# Patient Record
Sex: FEMALE | Race: BLACK OR AFRICAN AMERICAN | NOT HISPANIC OR LATINO | URBAN - METROPOLITAN AREA
[De-identification: names, ages, dates, MRNs, and addresses within clinical notes are randomized per-mention and may not be internally consistent; named-entity substitution may affect disease eponyms.]

---

## 2018-06-27 ENCOUNTER — EMERGENCY (EMERGENCY)
Facility: HOSPITAL | Age: 38
LOS: 1 days | Discharge: HOME | End: 2018-06-27
Attending: EMERGENCY MEDICINE
Payer: COMMERCIAL

## 2018-06-27 VITALS — WEIGHT: 154.1 LBS | HEIGHT: 61 IN

## 2018-06-27 PROCEDURE — 99284 EMERGENCY DEPT VISIT MOD MDM: CPT

## 2018-06-28 VITALS
SYSTOLIC BLOOD PRESSURE: 122 MMHG | TEMPERATURE: 98 F | RESPIRATION RATE: 16 BRPM | OXYGEN SATURATION: 100 % | DIASTOLIC BLOOD PRESSURE: 80 MMHG | HEART RATE: 80 BPM

## 2018-06-28 LAB
APPEARANCE UR: CLEAR — SIGNIFICANT CHANGE UP
BILIRUB UR-MCNC: NEGATIVE — SIGNIFICANT CHANGE UP
COLOR SPEC: YELLOW — SIGNIFICANT CHANGE UP
DIFF PNL FLD: ABNORMAL
GLUCOSE UR QL: NEGATIVE MG/DL — SIGNIFICANT CHANGE UP
HCG UR QL: NEGATIVE — SIGNIFICANT CHANGE UP
KETONES UR-MCNC: NEGATIVE — SIGNIFICANT CHANGE UP
LEUKOCYTE ESTERASE UR-ACNC: ABNORMAL
NITRITE UR-MCNC: NEGATIVE — SIGNIFICANT CHANGE UP
PH UR: 6 — SIGNIFICANT CHANGE UP (ref 5–8)
PROT UR-MCNC: NEGATIVE MG/DL — SIGNIFICANT CHANGE UP
RBC CASTS # UR COMP ASSIST: SIGNIFICANT CHANGE UP /HPF (ref 0–4)
SP GR SPEC: 1.01 — SIGNIFICANT CHANGE UP (ref 1.01–1.02)
UROBILINOGEN FLD QL: NEGATIVE MG/DL — SIGNIFICANT CHANGE UP
WBC UR QL: SIGNIFICANT CHANGE UP

## 2018-06-28 PROCEDURE — 87086 URINE CULTURE/COLONY COUNT: CPT

## 2018-06-28 PROCEDURE — 76830 TRANSVAGINAL US NON-OB: CPT | Mod: 26

## 2018-06-28 PROCEDURE — 81025 URINE PREGNANCY TEST: CPT

## 2018-06-28 PROCEDURE — 76856 US EXAM PELVIC COMPLETE: CPT | Mod: 26

## 2018-06-28 PROCEDURE — 76856 US EXAM PELVIC COMPLETE: CPT

## 2018-06-28 PROCEDURE — 99284 EMERGENCY DEPT VISIT MOD MDM: CPT

## 2018-06-28 PROCEDURE — 76830 TRANSVAGINAL US NON-OB: CPT

## 2018-06-28 PROCEDURE — 81001 URINALYSIS AUTO W/SCOPE: CPT

## 2018-06-28 RX ORDER — METRONIDAZOLE 500 MG
1 TABLET ORAL
Qty: 14 | Refills: 0 | OUTPATIENT
Start: 2018-06-28 | End: 2018-07-04

## 2018-06-28 NOTE — ED PROVIDER NOTE - OBJECTIVE STATEMENT
39 y/o F c/o right sided pelvic pain x 1 day, intermittent.  Patient denies nausea/vomiting, diarrhea, constipation, fever.  LMP was 1 week ago.

## 2018-06-28 NOTE — ED PROVIDER NOTE - ATTENDING CONTRIBUTION TO CARE
39 yo F presents to ED c/o RLQ abd/pelvic pain x 1 day which has been intermittent.  No assoc fever, vomiting, diarrhea, urinary s/s or vaginal bleeding or d/c.  On exam afebrile in NAD, Abd soft, NT, no R/R/G.  will check UA, UCG, check pelvic and re-eval

## 2018-06-29 LAB
CULTURE RESULTS: NO GROWTH — SIGNIFICANT CHANGE UP
SPECIMEN SOURCE: SIGNIFICANT CHANGE UP

## 2018-07-15 ENCOUNTER — EMERGENCY (EMERGENCY)
Facility: HOSPITAL | Age: 38
LOS: 1 days | Discharge: HOME | End: 2018-07-15
Attending: EMERGENCY MEDICINE | Admitting: EMERGENCY MEDICINE
Payer: COMMERCIAL

## 2018-07-15 VITALS
RESPIRATION RATE: 16 BRPM | OXYGEN SATURATION: 99 % | DIASTOLIC BLOOD PRESSURE: 76 MMHG | TEMPERATURE: 99 F | HEART RATE: 95 BPM | SYSTOLIC BLOOD PRESSURE: 116 MMHG

## 2018-07-15 VITALS — WEIGHT: 154.1 LBS | HEIGHT: 61 IN

## 2018-07-15 PROCEDURE — 99282 EMERGENCY DEPT VISIT SF MDM: CPT | Mod: 25

## 2018-07-15 PROCEDURE — 10080 I&D PILONIDAL CYST SIMPLE: CPT

## 2018-07-15 NOTE — ED ADULT NURSE NOTE - OBJECTIVE STATEMENT
pt alert and awake x3, arrived with cyst on her tailbone, denies fever/chills, denies chest pain/sob, LS clear.

## 2018-07-15 NOTE — ED ADULT TRIAGE NOTE - CHIEF COMPLAINT QUOTE
Patient is awake and oriented times 4, arrives ambulatory from home with a steady gait, patient complains "I have a cyst on my tailbone"

## 2018-07-15 NOTE — ED STATDOCS - OBJECTIVE STATEMENT
39 y/o F presents with c/o pain to tailbone since Wednesday.  Her boyfriend has been able to extract pus each day.  She presents with pain.  Denies fever or chills.  Has not taken anything for pain.

## 2018-07-15 NOTE — ED STATDOCS - ATTENDING CONTRIBUTION TO CARE
pimple noticed/ felt in interglutal cleft on wednesday: popped it.  Reports contued drainage asince but increasing pain.  Denies fever.  Painful to sit and walk.  No prior problems.  PE:  fluant swelling to superior aspect of intergluteal cleft with surrounding erytrhema.  A/P infected pilonidal cyst: I&D, [facility] ort[facility].

## 2018-07-17 ENCOUNTER — EMERGENCY (EMERGENCY)
Facility: HOSPITAL | Age: 38
LOS: 1 days | Discharge: HOME | End: 2018-07-17
Attending: EMERGENCY MEDICINE
Payer: COMMERCIAL

## 2018-07-17 VITALS
TEMPERATURE: 98 F | HEART RATE: 93 BPM | RESPIRATION RATE: 20 BRPM | SYSTOLIC BLOOD PRESSURE: 103 MMHG | OXYGEN SATURATION: 99 % | DIASTOLIC BLOOD PRESSURE: 67 MMHG

## 2018-07-17 VITALS — HEIGHT: 61 IN | WEIGHT: 154.1 LBS

## 2018-07-17 PROCEDURE — 99283 EMERGENCY DEPT VISIT LOW MDM: CPT

## 2018-07-17 PROCEDURE — 99284 EMERGENCY DEPT VISIT MOD MDM: CPT

## 2018-07-17 RX ORDER — AZTREONAM 2 G
1 VIAL (EA) INJECTION
Qty: 20 | Refills: 0 | OUTPATIENT
Start: 2018-07-17 | End: 2018-07-26

## 2018-07-17 RX ORDER — TRAMADOL HYDROCHLORIDE 50 MG/1
50 TABLET ORAL ONCE
Qty: 0 | Refills: 0 | Status: DISCONTINUED | OUTPATIENT
Start: 2018-07-17 | End: 2018-07-17

## 2018-07-17 RX ORDER — CEPHALEXIN 500 MG
500 CAPSULE ORAL ONCE
Qty: 0 | Refills: 0 | Status: COMPLETED | OUTPATIENT
Start: 2018-07-17 | End: 2018-07-17

## 2018-07-17 RX ORDER — CEPHALEXIN 500 MG
1 CAPSULE ORAL
Qty: 28 | Refills: 0 | OUTPATIENT
Start: 2018-07-17 | End: 2018-07-23

## 2018-07-17 RX ORDER — TRAMADOL HYDROCHLORIDE 50 MG/1
1 TABLET ORAL
Qty: 15 | Refills: 0 | OUTPATIENT
Start: 2018-07-17 | End: 2018-07-21

## 2018-07-17 RX ORDER — IBUPROFEN 200 MG
1 TABLET ORAL
Qty: 28 | Refills: 0 | OUTPATIENT
Start: 2018-07-17 | End: 2018-07-23

## 2018-07-17 RX ADMIN — Medication 1 TABLET(S): at 19:03

## 2018-07-17 RX ADMIN — TRAMADOL HYDROCHLORIDE 50 MILLIGRAM(S): 50 TABLET ORAL at 19:03

## 2018-07-17 RX ADMIN — Medication 500 MILLIGRAM(S): at 19:03

## 2018-07-17 NOTE — ED ADULT TRIAGE NOTE - CHIEF COMPLAINT QUOTE
Pt ambulatory in ED, reports she was in ED on Sunday and had a cyst packed on her coccyx. Pt states she was advised to return to ED to get packing removed.

## 2018-07-17 NOTE — ED PROVIDER NOTE - OBJECTIVE STATEMENT
37 yo F presented to ED for packing removal. Pt had I&D of pilonidal abscess 2 days ago. Denies any fevers/chills in the last 2 days. Pt currently taking OTC Motrin with some relief. Pt reports pain improved s/p I&D

## 2018-07-17 NOTE — ED PROVIDER NOTE - MEDICAL DECISION MAKING DETAILS
Abx, pain control, local care and f/u PMD Packing removed - Abx, pain control, local care and f/u PMD

## 2018-07-17 NOTE — ED PROVIDER NOTE - ATTENDING CONTRIBUTION TO CARE
1cm open and drainage pilonidal abscess. Packing in place. Mild erythema to area. Pt. well appearing. I have discussed the plan the ACP.

## 2018-07-21 ENCOUNTER — EMERGENCY (EMERGENCY)
Facility: HOSPITAL | Age: 38
LOS: 1 days | Discharge: HOME | End: 2018-07-21
Attending: EMERGENCY MEDICINE
Payer: COMMERCIAL

## 2018-07-21 VITALS
HEART RATE: 71 BPM | OXYGEN SATURATION: 98 % | SYSTOLIC BLOOD PRESSURE: 112 MMHG | DIASTOLIC BLOOD PRESSURE: 73 MMHG | RESPIRATION RATE: 18 BRPM | TEMPERATURE: 98 F

## 2018-07-21 VITALS — WEIGHT: 154.98 LBS | HEIGHT: 61 IN

## 2018-07-21 PROCEDURE — 99282 EMERGENCY DEPT VISIT SF MDM: CPT

## 2018-07-21 NOTE — ED STATDOCS - OBJECTIVE STATEMENT
39 yo F, no pertinent PmHx, presents to ED for wound check. PT states she has a pilonidal cyst, seen in [facility] received I&D, packing removal 4 days ago. Pt states abscess improved and is compliant with abx. PT denies fever, chills, N/V/D, urinary symptoms, abdominal pain, and any other acute symptoms at this time.

## 2018-07-21 NOTE — ED STATDOCS - SKIN, MLM
skin normal color for race, warm, dry and intact. + I&D site on central buttock cleft, small amount of yellow drainage, site is soft, non erythematous, non indurated

## 2018-07-21 NOTE — ED STATDOCS - ATTENDING CONTRIBUTION TO CARE
MD/PA VISIT  HERE FOR WOUND CHECK   HAD PILONIDAL CYST DRAINED  DOING BETTER AGREE WITH HX PE TX DISPO

## 2018-07-21 NOTE — ED STATDOCS - PROGRESS NOTE DETAILS
PT educated on warm soaks, cleaning of site, continuation of abx, and follow up with PMD. PT verbalized understanding of diagnosis and importance of follow up at PMD. PT educated on importance of follow up and when to return to the ED.

## 2018-12-09 ENCOUNTER — EMERGENCY (EMERGENCY)
Facility: HOSPITAL | Age: 38
LOS: 1 days | Discharge: HOME | End: 2018-12-09
Attending: EMERGENCY MEDICINE
Payer: SELF-PAY

## 2018-12-09 VITALS
OXYGEN SATURATION: 97 % | WEIGHT: 164.91 LBS | TEMPERATURE: 98 F | HEIGHT: 61 IN | HEART RATE: 70 BPM | DIASTOLIC BLOOD PRESSURE: 77 MMHG | RESPIRATION RATE: 20 BRPM | SYSTOLIC BLOOD PRESSURE: 128 MMHG

## 2018-12-09 LAB
ALBUMIN SERPL ELPH-MCNC: 4.2 G/DL — SIGNIFICANT CHANGE UP (ref 3.3–5.2)
ALP SERPL-CCNC: 46 U/L — SIGNIFICANT CHANGE UP (ref 40–120)
ALT FLD-CCNC: 12 U/L — SIGNIFICANT CHANGE UP
ANION GAP SERPL CALC-SCNC: 10 MMOL/L — SIGNIFICANT CHANGE UP (ref 5–17)
APPEARANCE UR: CLEAR — SIGNIFICANT CHANGE UP
AST SERPL-CCNC: 18 U/L — SIGNIFICANT CHANGE UP
BASOPHILS # BLD AUTO: 0 K/UL — SIGNIFICANT CHANGE UP (ref 0–0.2)
BASOPHILS NFR BLD AUTO: 0.1 % — SIGNIFICANT CHANGE UP (ref 0–2)
BILIRUB SERPL-MCNC: 0.7 MG/DL — SIGNIFICANT CHANGE UP (ref 0.4–2)
BILIRUB UR-MCNC: NEGATIVE — SIGNIFICANT CHANGE UP
BUN SERPL-MCNC: 9 MG/DL — SIGNIFICANT CHANGE UP (ref 8–20)
CALCIUM SERPL-MCNC: 9.3 MG/DL — SIGNIFICANT CHANGE UP (ref 8.6–10.2)
CHLORIDE SERPL-SCNC: 103 MMOL/L — SIGNIFICANT CHANGE UP (ref 98–107)
CO2 SERPL-SCNC: 26 MMOL/L — SIGNIFICANT CHANGE UP (ref 22–29)
COLOR SPEC: YELLOW — SIGNIFICANT CHANGE UP
CREAT SERPL-MCNC: 0.58 MG/DL — SIGNIFICANT CHANGE UP (ref 0.5–1.3)
DIFF PNL FLD: ABNORMAL
EOSINOPHIL # BLD AUTO: 0.1 K/UL — SIGNIFICANT CHANGE UP (ref 0–0.5)
EOSINOPHIL NFR BLD AUTO: 0.9 % — SIGNIFICANT CHANGE UP (ref 0–6)
GLUCOSE SERPL-MCNC: 113 MG/DL — SIGNIFICANT CHANGE UP (ref 70–115)
GLUCOSE UR QL: NEGATIVE MG/DL — SIGNIFICANT CHANGE UP
HCG UR QL: NEGATIVE — SIGNIFICANT CHANGE UP
HCT VFR BLD CALC: 36.9 % — LOW (ref 37–47)
HGB BLD-MCNC: 11.9 G/DL — LOW (ref 12–16)
KETONES UR-MCNC: ABNORMAL
LEUKOCYTE ESTERASE UR-ACNC: ABNORMAL
LYMPHOCYTES # BLD AUTO: 2.1 K/UL — SIGNIFICANT CHANGE UP (ref 1–4.8)
LYMPHOCYTES # BLD AUTO: 28.9 % — SIGNIFICANT CHANGE UP (ref 20–55)
MCHC RBC-ENTMCNC: 30.5 PG — SIGNIFICANT CHANGE UP (ref 27–31)
MCHC RBC-ENTMCNC: 32.2 G/DL — SIGNIFICANT CHANGE UP (ref 32–36)
MCV RBC AUTO: 94.6 FL — SIGNIFICANT CHANGE UP (ref 81–99)
MONOCYTES # BLD AUTO: 0.6 K/UL — SIGNIFICANT CHANGE UP (ref 0–0.8)
MONOCYTES NFR BLD AUTO: 8.2 % — SIGNIFICANT CHANGE UP (ref 3–10)
NEUTROPHILS # BLD AUTO: 4.6 K/UL — SIGNIFICANT CHANGE UP (ref 1.8–8)
NEUTROPHILS NFR BLD AUTO: 61.8 % — SIGNIFICANT CHANGE UP (ref 37–73)
NITRITE UR-MCNC: NEGATIVE — SIGNIFICANT CHANGE UP
PH UR: 6 — SIGNIFICANT CHANGE UP (ref 5–8)
PLATELET # BLD AUTO: 256 K/UL — SIGNIFICANT CHANGE UP (ref 150–400)
POTASSIUM SERPL-MCNC: 3.9 MMOL/L — SIGNIFICANT CHANGE UP (ref 3.5–5.3)
POTASSIUM SERPL-SCNC: 3.9 MMOL/L — SIGNIFICANT CHANGE UP (ref 3.5–5.3)
PROT SERPL-MCNC: 7.5 G/DL — SIGNIFICANT CHANGE UP (ref 6.6–8.7)
PROT UR-MCNC: NEGATIVE MG/DL — SIGNIFICANT CHANGE UP
RBC # BLD: 3.9 M/UL — LOW (ref 4.4–5.2)
RBC # FLD: 12.4 % — SIGNIFICANT CHANGE UP (ref 11–15.6)
SODIUM SERPL-SCNC: 139 MMOL/L — SIGNIFICANT CHANGE UP (ref 135–145)
SP GR SPEC: 1.02 — SIGNIFICANT CHANGE UP (ref 1.01–1.02)
UROBILINOGEN FLD QL: NEGATIVE MG/DL — SIGNIFICANT CHANGE UP
WBC # BLD: 7.4 K/UL — SIGNIFICANT CHANGE UP (ref 4.8–10.8)
WBC # FLD AUTO: 7.4 K/UL — SIGNIFICANT CHANGE UP (ref 4.8–10.8)

## 2018-12-09 PROCEDURE — 80053 COMPREHEN METABOLIC PANEL: CPT

## 2018-12-09 PROCEDURE — 76856 US EXAM PELVIC COMPLETE: CPT

## 2018-12-09 PROCEDURE — 74177 CT ABD & PELVIS W/CONTRAST: CPT | Mod: 26

## 2018-12-09 PROCEDURE — 99284 EMERGENCY DEPT VISIT MOD MDM: CPT

## 2018-12-09 PROCEDURE — 76830 TRANSVAGINAL US NON-OB: CPT

## 2018-12-09 PROCEDURE — 81025 URINE PREGNANCY TEST: CPT

## 2018-12-09 PROCEDURE — 36415 COLL VENOUS BLD VENIPUNCTURE: CPT

## 2018-12-09 PROCEDURE — 85027 COMPLETE CBC AUTOMATED: CPT

## 2018-12-09 PROCEDURE — 99284 EMERGENCY DEPT VISIT MOD MDM: CPT | Mod: 25

## 2018-12-09 PROCEDURE — 76830 TRANSVAGINAL US NON-OB: CPT | Mod: 26

## 2018-12-09 PROCEDURE — 81001 URINALYSIS AUTO W/SCOPE: CPT

## 2018-12-09 PROCEDURE — 76856 US EXAM PELVIC COMPLETE: CPT | Mod: 26

## 2018-12-09 PROCEDURE — 74177 CT ABD & PELVIS W/CONTRAST: CPT

## 2018-12-09 NOTE — ED PROVIDER NOTE - MEDICAL DECISION MAKING DETAILS
Pelvic/transvag US ordered by PIT doc. Will add on RLQ abdominal US to rule out appendicitis. UA without UTI, urine pregnancy negative and pelvic/transvag US WNL. Pelvic/transvag US ordered by PIT doc. Will CT abdo with PO and IV contrast to rule out appendicitis. UA without UTI, urine pregnancy negative and pelvic/transvag US WNL.

## 2018-12-09 NOTE — ED ADULT NURSE NOTE - CAS ELECT INFOMATION PROVIDED
DC instructions/patient given d/c instructios and instructed to follow up with pmd and return if worse

## 2018-12-09 NOTE — ED PROVIDER NOTE - ATTENDING CONTRIBUTION TO CARE
38 year old with 2 days of RLQ pain.  Patient well appearing, afebrile, vitals stable, RLQ tenderness on exam with no guarding or rebound tenderness.  CT and US negative for acute pathology.  Patient given medication for pain and instructed to take OTC medication for pain and follow-up with PMD.  If symptoms continue follow-up with GI.

## 2018-12-09 NOTE — ED ADULT NURSE NOTE - OBJECTIVE STATEMENT
Pt awake, alert and oriented x3 c/o cramping and aching to RLQ x2 days.  denies n/v/d.  Respirations even and unlabored, denies chest pain.   Skin warm and dry.  Moving all extremities well and with purpose.

## 2018-12-09 NOTE — ED PROVIDER NOTE - PHYSICAL EXAMINATION
PE: General: NAD, sitting up in bed drinking coffee. Eyes: PERRLA, EOM intact. Neck: Supple and symmetrical, no JVD. CV: RRR, no m/r/g. Lungs: CTA b/l, no use of accessory muscles, no wheezes, rales, rhonchi. Skin: warm, dry, no rashes. Psych: normal mood/affect. Abdomen: Normal BS, soft, point tenderness in RLQ, no abdominal guarding or rigidity, no rebound abdominal tenderness. Extremities: no edema, cyanosis. Musculoskeletal: No CVA tenderness, strength b/l UE/LE, normal ROM, no joint swelling. Neuro: A & O X 3, CN 2-12 grossly intact, no sensory or motor deficit.

## 2018-12-09 NOTE — ED PROVIDER NOTE - PROGRESS NOTE DETAILS
UA without UTI, urine pregnancy negative and pelvic/transvag US WNL.. Will CT abdo with PO and IV contrast to rule out appendicitis. UA without UTI, urine pregnancy negative and pelvic/transvag US WNL.. Will get CT abdo with PO and IV contrast to rule out appendicitis. Will order cbc and cmp Care signed out to LOUANN ernst Reviewed ct abd/pelvis, lab work reviewed and urine, copy of results, pt explained results and d/c instructions

## 2018-12-09 NOTE — ED STATDOCS - OBJECTIVE STATEMENT
Telemedicine assessment was conducted (using real time 2 way audio-video technology) by Dr. Corey Ferrara located at [address] [place] [address]  ++++++++++++++++++++++++  Pertinent patient history and initial plan:    39 y/o F pt presents to ED c/o lower abdominal pain for the past 2 days. Yesterday pain started radiating to left flank. Pain described as a "cramping sensation". Last menstrual cycle 11/8, however 2 weeks ago 1 day of spotting. She had a negative pregnancy test at home 3 days ago. Denies urinary symptoms, vaginal bleeding. No further complaints at this time.   LMP: 11/8 Telemedicine assessment was conducted (using real time 2 way audio-video technology) by Dr. Corey Ferrara located at [address] [place] [address]  ++++++++++++++++++++++++  Pertinent patient history and initial plan:    39 y/o F pt presents to ED c/o lower abdominal pain for the past 2 days. Yesterday pain started radiating to left flank. Pain described as a "cramping sensation". Last menstrual cycle 11/8, however 2 weeks ago 1 day of spotting. She had a negative pregnancy test at home 3 days ago. Denies urinary symptoms, vaginal bleeding. No further complaints at this time.   LMP: 11/8    ua, ucg, pelvic sono    Patient seen by me in intake for initial assessment and ordering. Physician on site to follow results and further evaluate and treat patient.

## 2018-12-09 NOTE — ED ADULT NURSE NOTE - NSIMPLEMENTINTERV_GEN_ALL_ED
Implemented All Universal Safety Interventions:  [place] to call system. Call bell, personal items and telephone within reach. Instruct patient to call for assistance. Room bathroom lighting operational. Non-slip footwear when patient is off stretcher. Physically safe environment: no spills, clutter or unnecessary equipment. Stretcher in lowest position, wheels locked, appropriate side rails in place.

## 2018-12-09 NOTE — ED PROVIDER NOTE - OBJECTIVE STATEMENT
38 y.o F with no PMH presents to ED complaining of intermittent RLQ pain x 2 days. Pt states she has been experiencing "crampy/aching" abdominal pain located in the RLQ>LLQ for the past 2 days. Pt states she has not experienced similar pain in the past. Pain is not associated with nausea/vomiting, fever or chills. Pt is tolerating PO intake, currently drinking coffee. Pts vital signs are stable in the ED. Pt states LMP 11/8, however 2 weeks ago 1 day of spotting. She had negative pregnancy test at home 3 days ago. Pt denies n/v, fever, chills, urinary symptoms, vaginal bleeding. No further complaints at this time. Remainder of ROS negative.

## 2019-01-04 ENCOUNTER — EMERGENCY (EMERGENCY)
Facility: HOSPITAL | Age: 39
LOS: 1 days | Discharge: HOME | End: 2019-01-04
Attending: EMERGENCY MEDICINE
Payer: MEDICAID

## 2019-01-04 VITALS
WEIGHT: 151.9 LBS | SYSTOLIC BLOOD PRESSURE: 111 MMHG | HEART RATE: 81 BPM | OXYGEN SATURATION: 100 % | TEMPERATURE: 98 F | RESPIRATION RATE: 18 BRPM | HEIGHT: 61 IN | DIASTOLIC BLOOD PRESSURE: 70 MMHG

## 2019-01-04 PROCEDURE — 99283 EMERGENCY DEPT VISIT LOW MDM: CPT

## 2019-01-04 PROCEDURE — 87070 CULTURE OTHR SPECIMN AEROBIC: CPT

## 2019-01-04 PROCEDURE — 87186 SC STD MICRODIL/AGAR DIL: CPT

## 2019-01-04 NOTE — ED STATDOCS - OBJECTIVE STATEMENT
38 y.o otherwise healthy F presents to ED c/o worsening possible insect bites for the last 4-5 days. Pt states the areas are painful and some are "oozing". Taking Tylenol for pain. NKDA. Denies fever, chills, or additional physical complaints at this time. 38 y.o otherwise healthy F presents to ED c/o bumps on legs for the past 4-5 days, possible insect bites. Pt states the areas are painful, feel hot and are "oozing". Has been cleaning with rubbing alcohol.  Reports pain/ difficulty walking.  Taking Tylenol for pain. NKDA. Denies fever, chills, or additional physical complaints at this time.

## 2019-01-04 NOTE — ED STATDOCS - CARE PLAN
Principal Discharge DX:	Furuncles  Assessment and plan of treatment:	warm compresses and antibiotics as prescribed.

## 2019-01-04 NOTE — ED ADULT NURSE NOTE - OBJECTIVE STATEMENT
Patient is alert and verbal. report wound to left leg below the knee. onset 5 days ago. unsure f it is an insect bite. started as a pimple and progressively getting worse. difficulty bearing on leg due to pain.

## 2019-01-04 NOTE — ED STATDOCS - SKIN, MLM
skin normal color for race, warm, dry. Multiple discrete, small soft tissue abscesses in b/l lower extremities with surrounding erythema, induration and tenderness. Multiple discrete, small (less than size of a dime), tender subcutaneous lesions with surrounding erythema and induration to b/l lower extremities.  Purulent drainage expressed from a few lesions.

## 2019-01-04 NOTE — ED STATDOCS - MEDICAL DECISION MAKING DETAILS
38 y.o F presents with likely MRSA. Will take wound cultures and treat with two week course doxycycline. 38 y.o F presents with likely MRSA. Will take wound cultures and treat empirically with two week course doxycycline.

## 2019-01-06 LAB
-  AMPICILLIN/SULBACTAM: SIGNIFICANT CHANGE UP
-  AMPICILLIN/SULBACTAM: SIGNIFICANT CHANGE UP
-  CEFAZOLIN: SIGNIFICANT CHANGE UP
-  CEFAZOLIN: SIGNIFICANT CHANGE UP
-  CLINDAMYCIN: SIGNIFICANT CHANGE UP
-  CLINDAMYCIN: SIGNIFICANT CHANGE UP
-  DAPTOMYCIN: SIGNIFICANT CHANGE UP
-  DAPTOMYCIN: SIGNIFICANT CHANGE UP
-  ERYTHROMYCIN: SIGNIFICANT CHANGE UP
-  ERYTHROMYCIN: SIGNIFICANT CHANGE UP
-  GENTAMICIN: SIGNIFICANT CHANGE UP
-  GENTAMICIN: SIGNIFICANT CHANGE UP
-  LINEZOLID: SIGNIFICANT CHANGE UP
-  LINEZOLID: SIGNIFICANT CHANGE UP
-  OXACILLIN: SIGNIFICANT CHANGE UP
-  OXACILLIN: SIGNIFICANT CHANGE UP
-  PENICILLIN: SIGNIFICANT CHANGE UP
-  PENICILLIN: SIGNIFICANT CHANGE UP
-  RIFAMPIN: SIGNIFICANT CHANGE UP
-  RIFAMPIN: SIGNIFICANT CHANGE UP
-  TETRACYCLINE: SIGNIFICANT CHANGE UP
-  TETRACYCLINE: SIGNIFICANT CHANGE UP
-  TRIMETHOPRIM/SULFAMETHOXAZOLE: SIGNIFICANT CHANGE UP
-  TRIMETHOPRIM/SULFAMETHOXAZOLE: SIGNIFICANT CHANGE UP
-  VANCOMYCIN: SIGNIFICANT CHANGE UP
-  VANCOMYCIN: SIGNIFICANT CHANGE UP
CULTURE RESULTS: SIGNIFICANT CHANGE UP
CULTURE RESULTS: SIGNIFICANT CHANGE UP
METHOD TYPE: SIGNIFICANT CHANGE UP
METHOD TYPE: SIGNIFICANT CHANGE UP
ORGANISM # SPEC MICROSCOPIC CNT: SIGNIFICANT CHANGE UP
SPECIMEN SOURCE: SIGNIFICANT CHANGE UP
SPECIMEN SOURCE: SIGNIFICANT CHANGE UP

## 2019-03-26 ENCOUNTER — EMERGENCY (EMERGENCY)
Facility: HOSPITAL | Age: 39
LOS: 1 days | Discharge: HOME | End: 2019-03-26
Attending: EMERGENCY MEDICINE
Payer: COMMERCIAL

## 2019-03-26 VITALS
SYSTOLIC BLOOD PRESSURE: 127 MMHG | RESPIRATION RATE: 20 BRPM | HEIGHT: 61 IN | TEMPERATURE: 98 F | DIASTOLIC BLOOD PRESSURE: 81 MMHG | HEART RATE: 75 BPM | OXYGEN SATURATION: 100 % | WEIGHT: 154.98 LBS

## 2019-03-26 PROCEDURE — 10160 PNXR ASPIR ABSC HMTMA BULLA: CPT

## 2019-03-26 PROCEDURE — 82962 GLUCOSE BLOOD TEST: CPT

## 2019-03-26 PROCEDURE — 10060 I&D ABSCESS SIMPLE/SINGLE: CPT

## 2019-03-26 PROCEDURE — 99283 EMERGENCY DEPT VISIT LOW MDM: CPT | Mod: 25

## 2019-03-26 RX ORDER — AZTREONAM 2 G
1 VIAL (EA) INJECTION
Qty: 20 | Refills: 0 | OUTPATIENT
Start: 2019-03-26 | End: 2019-04-04

## 2019-03-26 RX ORDER — CHLORHEXIDINE GLUCONATE 213 G/1000ML
1 SOLUTION TOPICAL
Qty: 1 | Refills: 0 | OUTPATIENT
Start: 2019-03-26 | End: 2019-04-08

## 2019-03-26 RX ADMIN — Medication 1 TABLET(S): at 20:26

## 2019-03-26 RX ADMIN — Medication 100 MILLIGRAM(S): at 19:45

## 2019-03-26 NOTE — ED PROVIDER NOTE - CLINICAL SUMMARY MEDICAL DECISION MAKING FREE TEXT BOX
Needle aspiration of one abscess, bactrim for antibiotics, chlorhexidine wash instructions explained to patient, explained to patient to have separate wash cloth at home when washing body (do not cross contaminate with other people in the household), derm referral, pt explained results and d/c instructions

## 2019-03-26 NOTE — ED PROVIDER NOTE - PHYSICAL EXAMINATION
Const: Awake, alert and oriented. In no acute distress. Well appearing.  HEENT: NC/AT. Moist mucous membranes.  Eyes: No scleral icterus. EOMI.  Neck:. Soft and supple. Full ROM without pain.  Cardiac:  +S1/S2. No murmurs. Peripheral pulses 2+ and symmetric. No LE edema.  Resp: Speaking in full sentences. No evidence of respiratory distress. No wheezes, rales or rhonchi.  Abd: Soft, non-tender, non-distended. Normal bowel sounds in all 4 quadrants. No guarding or rebound.  Back: Spine midline and non-tender. No CVAT.  Skin: Multiple small abscesses noted to left lower leg below the patella, induration noted, no signs of cellulitis   Lymph: No cervical lymphadenopathy.  Neuro: Awake, alert & oriented x 3. Moves all extremities symmetrically.

## 2019-03-26 NOTE — ED ADULT NURSE NOTE - NSIMPLEMENTINTERV_GEN_ALL_ED
Implemented All Universal Safety Interventions:  [place] to call system. Call bell, personal items and telephone within reach. Instruct patient to call for assistance. Room bathroom lighting operational. Non-slip footwear when patient is off stretcher. Physically safe environment: no spills, clutter or unnecessary equipment. Stretcher in lowest position, wheels locked, appropriate side rails in place.

## 2019-03-26 NOTE — ED PROVIDER NOTE - OBJECTIVE STATEMENT
Patient is a 40 y/o female presenting for abscesses to left lower leg. Patient states she has three different abscesses to the leg. Patient states in January this happened to her when she had multiple abscesses to the left leg. Patient states she was started on a course of antibiotics and eventually the abscesses healed. Patient denies fevers. Patient denies injuries or trauma to the leg. Patient denies numbness or loss of sensation, abd pain, back pain, nausea, vomiting, cp.

## 2019-03-26 NOTE — ED PROVIDER NOTE - ATTENDING CONTRIBUTION TO CARE
well appearing adult female, NAD; multiple scattered small pustular lesions to b/l lower legs, with one on left ant tib fib more confluent and tender c/w evolving abscess; otherwise neurovasc intact; likely MRSA given pattern and hx; bactrim; advised chlorhexidine body wash

## 2019-03-27 RX ORDER — CHLORHEXIDINE GLUCONATE 213 G/1000ML
1 SOLUTION TOPICAL
Qty: 1 | Refills: 0 | OUTPATIENT
Start: 2019-03-27 | End: 2019-04-09

## 2019-05-16 ENCOUNTER — APPOINTMENT (OUTPATIENT)
Dept: INTERNAL MEDICINE | Facility: CLINIC | Age: 39
End: 2019-05-16

## 2021-10-25 ENCOUNTER — EMERGENCY (EMERGENCY)
Facility: HOSPITAL | Age: 41
LOS: 1 days | Discharge: HOME | End: 2021-10-25
Attending: EMERGENCY MEDICINE
Payer: MEDICAID

## 2021-10-25 VITALS
OXYGEN SATURATION: 98 % | RESPIRATION RATE: 18 BRPM | WEIGHT: 169.98 LBS | SYSTOLIC BLOOD PRESSURE: 111 MMHG | HEIGHT: 61 IN | HEART RATE: 82 BPM | TEMPERATURE: 100 F | DIASTOLIC BLOOD PRESSURE: 72 MMHG

## 2021-10-25 LAB
ANION GAP SERPL CALC-SCNC: 12 MMOL/L — SIGNIFICANT CHANGE UP (ref 5–17)
APPEARANCE UR: CLEAR — SIGNIFICANT CHANGE UP
BACTERIA # UR AUTO: ABNORMAL
BASOPHILS # BLD AUTO: 0.02 K/UL — SIGNIFICANT CHANGE UP (ref 0–0.2)
BASOPHILS NFR BLD AUTO: 0.2 % — SIGNIFICANT CHANGE UP (ref 0–2)
BILIRUB UR-MCNC: NEGATIVE — SIGNIFICANT CHANGE UP
BLD GP AB SCN SERPL QL: SIGNIFICANT CHANGE UP
BUN SERPL-MCNC: 7.6 MG/DL — LOW (ref 8–20)
CALCIUM SERPL-MCNC: 8.8 MG/DL — SIGNIFICANT CHANGE UP (ref 8.6–10.2)
CHLORIDE SERPL-SCNC: 103 MMOL/L — SIGNIFICANT CHANGE UP (ref 98–107)
CO2 SERPL-SCNC: 22 MMOL/L — SIGNIFICANT CHANGE UP (ref 22–29)
COLOR SPEC: YELLOW — SIGNIFICANT CHANGE UP
CREAT SERPL-MCNC: 0.76 MG/DL — SIGNIFICANT CHANGE UP (ref 0.5–1.3)
DIFF PNL FLD: ABNORMAL
EOSINOPHIL # BLD AUTO: 0.08 K/UL — SIGNIFICANT CHANGE UP (ref 0–0.5)
EOSINOPHIL NFR BLD AUTO: 0.9 % — SIGNIFICANT CHANGE UP (ref 0–6)
EPI CELLS # UR: ABNORMAL
GLUCOSE SERPL-MCNC: 108 MG/DL — HIGH (ref 70–99)
GLUCOSE UR QL: 250 MG/DL
HCG SERPL-ACNC: HIGH MIU/ML
HCG UR QL: POSITIVE
HCT VFR BLD CALC: 36.6 % — SIGNIFICANT CHANGE UP (ref 34.5–45)
HGB BLD-MCNC: 12.2 G/DL — SIGNIFICANT CHANGE UP (ref 11.5–15.5)
HIV 1 & 2 AB SERPL IA.RAPID: SIGNIFICANT CHANGE UP
IMM GRANULOCYTES NFR BLD AUTO: 0.5 % — SIGNIFICANT CHANGE UP (ref 0–1.5)
KETONES UR-MCNC: ABNORMAL
LEUKOCYTE ESTERASE UR-ACNC: ABNORMAL
LYMPHOCYTES # BLD AUTO: 1.79 K/UL — SIGNIFICANT CHANGE UP (ref 1–3.3)
LYMPHOCYTES # BLD AUTO: 21.1 % — SIGNIFICANT CHANGE UP (ref 13–44)
MCHC RBC-ENTMCNC: 31.8 PG — SIGNIFICANT CHANGE UP (ref 27–34)
MCHC RBC-ENTMCNC: 33.3 GM/DL — SIGNIFICANT CHANGE UP (ref 32–36)
MCV RBC AUTO: 95.3 FL — SIGNIFICANT CHANGE UP (ref 80–100)
MONOCYTES # BLD AUTO: 0.68 K/UL — SIGNIFICANT CHANGE UP (ref 0–0.9)
MONOCYTES NFR BLD AUTO: 8 % — SIGNIFICANT CHANGE UP (ref 2–14)
NEUTROPHILS # BLD AUTO: 5.88 K/UL — SIGNIFICANT CHANGE UP (ref 1.8–7.4)
NEUTROPHILS NFR BLD AUTO: 69.3 % — SIGNIFICANT CHANGE UP (ref 43–77)
NITRITE UR-MCNC: NEGATIVE — SIGNIFICANT CHANGE UP
PH UR: 6 — SIGNIFICANT CHANGE UP (ref 5–8)
PLATELET # BLD AUTO: 198 K/UL — SIGNIFICANT CHANGE UP (ref 150–400)
POTASSIUM SERPL-MCNC: 3.8 MMOL/L — SIGNIFICANT CHANGE UP (ref 3.5–5.3)
POTASSIUM SERPL-SCNC: 3.8 MMOL/L — SIGNIFICANT CHANGE UP (ref 3.5–5.3)
PROT UR-MCNC: 15 MG/DL
RBC # BLD: 3.84 M/UL — SIGNIFICANT CHANGE UP (ref 3.8–5.2)
RBC # FLD: 11.9 % — SIGNIFICANT CHANGE UP (ref 10.3–14.5)
RBC CASTS # UR COMP ASSIST: ABNORMAL /HPF (ref 0–4)
SODIUM SERPL-SCNC: 137 MMOL/L — SIGNIFICANT CHANGE UP (ref 135–145)
SP GR SPEC: 1.02 — SIGNIFICANT CHANGE UP (ref 1.01–1.02)
UROBILINOGEN FLD QL: NEGATIVE MG/DL — SIGNIFICANT CHANGE UP
WBC # BLD: 8.49 K/UL — SIGNIFICANT CHANGE UP (ref 3.8–10.5)
WBC # FLD AUTO: 8.49 K/UL — SIGNIFICANT CHANGE UP (ref 3.8–10.5)
WBC UR QL: ABNORMAL

## 2021-10-25 PROCEDURE — 84702 CHORIONIC GONADOTROPIN TEST: CPT

## 2021-10-25 PROCEDURE — 76801 OB US < 14 WKS SINGLE FETUS: CPT

## 2021-10-25 PROCEDURE — 36415 COLL VENOUS BLD VENIPUNCTURE: CPT

## 2021-10-25 PROCEDURE — 96374 THER/PROPH/DIAG INJ IV PUSH: CPT

## 2021-10-25 PROCEDURE — 86901 BLOOD TYPING SEROLOGIC RH(D): CPT

## 2021-10-25 PROCEDURE — 86850 RBC ANTIBODY SCREEN: CPT

## 2021-10-25 PROCEDURE — 76817 TRANSVAGINAL US OBSTETRIC: CPT

## 2021-10-25 PROCEDURE — 81001 URINALYSIS AUTO W/SCOPE: CPT

## 2021-10-25 PROCEDURE — 99284 EMERGENCY DEPT VISIT MOD MDM: CPT | Mod: 25

## 2021-10-25 PROCEDURE — 81025 URINE PREGNANCY TEST: CPT

## 2021-10-25 PROCEDURE — 85025 COMPLETE CBC W/AUTO DIFF WBC: CPT

## 2021-10-25 PROCEDURE — 99285 EMERGENCY DEPT VISIT HI MDM: CPT

## 2021-10-25 PROCEDURE — 76817 TRANSVAGINAL US OBSTETRIC: CPT | Mod: 26

## 2021-10-25 PROCEDURE — 86900 BLOOD TYPING SEROLOGIC ABO: CPT

## 2021-10-25 PROCEDURE — 76801 OB US < 14 WKS SINGLE FETUS: CPT | Mod: 26

## 2021-10-25 PROCEDURE — 86703 HIV-1/HIV-2 1 RESULT ANTBDY: CPT

## 2021-10-25 PROCEDURE — 80048 BASIC METABOLIC PNL TOTAL CA: CPT

## 2021-10-25 RX ORDER — PYRIDOXINE HCL (VITAMIN B6) 100 MG
1 TABLET ORAL
Qty: 7 | Refills: 0
Start: 2021-10-25 | End: 2021-10-31

## 2021-10-25 RX ORDER — CEPHALEXIN 500 MG
1 CAPSULE ORAL
Qty: 40 | Refills: 0
Start: 2021-10-25 | End: 2021-11-03

## 2021-10-25 RX ORDER — DOXYLAMINE SUCCINATE 25 MG
1 TABLET ORAL
Qty: 7 | Refills: 0
Start: 2021-10-25 | End: 2021-10-31

## 2021-10-25 RX ORDER — DOXYLAMINE SUCCINATE AND PYRIDOXINE HYDROCHLORIDE, DELAYED RELEASE TABLETS 10 MG/10 MG 10; 10 MG/1; MG/1
1 TABLET, DELAYED RELEASE ORAL
Qty: 7 | Refills: 0
Start: 2021-10-25 | End: 2021-10-31

## 2021-10-25 RX ORDER — CEFTRIAXONE 500 MG/1
1000 INJECTION, POWDER, FOR SOLUTION INTRAMUSCULAR; INTRAVENOUS ONCE
Refills: 0 | Status: COMPLETED | OUTPATIENT
Start: 2021-10-25 | End: 2021-10-25

## 2021-10-25 RX ADMIN — CEFTRIAXONE 100 MILLIGRAM(S): 500 INJECTION, POWDER, FOR SOLUTION INTRAMUSCULAR; INTRAVENOUS at 14:16

## 2021-10-25 NOTE — ED STATDOCS - OBJECTIVE STATEMENT
42y/o F; 6 weeks pregnant,  presents to the ED c/o sharp left sided flank pain with occasional radiation to abdomen and vaginal area that began 6 days ago. Additional c/o nausea and intermittent palpitations. Pt states pain is intermittent, lasts ~ 5 minutes before resolving, states vaginal pain is a pricking sensation. Pt states that pain is triggered by movement. She reports vaginal pricking occurs less than 10 times per day, lasting only a few seconds. LMP , lasting 2 days which is normal for her. Denies urinary complaints, vaginal discharge/bleeding, fever, CP. Chlamydia over 20 years ago. 42y/o F; 6 weeks pregnant,  presents to the ED c/o sharp left sided flank pain with occasional radiation to abdomen and vaginal area that began 6 days ago. associated nausea, vomiting and intermittent palpitations. Pt states left flank pain is intermittent, lasts ~ 5 minutes, resolves spontaneously. states vaginal pain is a pricking sensation. Pt states that pain is triggered by movement. She reports vaginal pricking occurs less than 10 times per day, lasting only a few seconds. LMP , lasting 2 days which is normal for her. Denies urinary complaints, vaginal discharge/bleeding, fever, CP. Chlamydia over 20 years ago.

## 2021-10-25 NOTE — ED STATDOCS - CARE PLAN
1 Principal Discharge DX:	Abdominal pain   Principal Discharge DX:	UTI in pregnancy, first trimester

## 2021-10-25 NOTE — ED STATDOCS - NS_ ATTENDINGSCRIBEDETAILS _ED_A_ED_FT
I, Jose Lynn, performed the initial face to face bedside interview with this patient regarding history of present illness, review of symptoms and relevant past medical, social and family history.  I completed an independent physical examination.  I was the provider who initially evaluated this patient.  The history, relevant review of systems, past medical and surgical history, medical decision making, and physical examination was documented by the scribe in my presence and I attest to the accuracy of the documentation. Follow-up on ordered tests (ie labs, radiologic studies) and re-evaluation of the patient's status has been communicated to the ACP.  Disposition of the patient will be based on test outcome and response to ED interventions.

## 2021-10-25 NOTE — ED STATDOCS - CLINICAL SUMMARY MEDICAL DECISION MAKING FREE TEXT BOX
Flank and abdominal pain; ideology unclear. Labs and US. intermittent left flank and lower abdominal pain; etiology unclear. Labs and US.

## 2021-10-25 NOTE — ED STATDOCS - PROGRESS NOTE DETAILS
ultrasound showing live intrauterine pregnancy, denies vaginal bleeding at this time - return precautions advised - will prescribe keflex for uti

## 2021-10-25 NOTE — ED STATDOCS - PATIENT PORTAL LINK FT
You can access the FollowMyHealth Patient Portal offered by [facility] by registering at the following website: [email or web address]. By joining [employer]’s FollowMyHealth portal, you will also be able to view your health information using other applications (apps) compatible with our system. You can access the FollowMyHealth Patient Portal offered by [facility] by registering at the following website: [email or web address]. By joining [employer]’s FollowMyHealth portal, you will also be able to view your health information using other applications (apps) compatible with our system.

## 2021-10-25 NOTE — ED STATDOCS - GASTROINTESTINAL, MLM
Soft, dull to percussion, no localized tenderness. Non distended. No CVAT Soft, dull to percussion, no localized tenderness. Non distended.

## 2021-12-04 ENCOUNTER — EMERGENCY (EMERGENCY)
Facility: HOSPITAL | Age: 41
LOS: 1 days | Discharge: HOME | End: 2021-12-04
Attending: EMERGENCY MEDICINE
Payer: COMMERCIAL

## 2021-12-04 VITALS
HEART RATE: 91 BPM | HEIGHT: 61 IN | DIASTOLIC BLOOD PRESSURE: 72 MMHG | OXYGEN SATURATION: 99 % | SYSTOLIC BLOOD PRESSURE: 104 MMHG | TEMPERATURE: 98 F | RESPIRATION RATE: 18 BRPM

## 2021-12-04 LAB
ALBUMIN SERPL ELPH-MCNC: 3.9 G/DL — SIGNIFICANT CHANGE UP (ref 3.3–5.2)
ALP SERPL-CCNC: 56 U/L — SIGNIFICANT CHANGE UP (ref 40–120)
ALT FLD-CCNC: 9 U/L — SIGNIFICANT CHANGE UP
ANION GAP SERPL CALC-SCNC: 11 MMOL/L — SIGNIFICANT CHANGE UP (ref 5–17)
AST SERPL-CCNC: 15 U/L — SIGNIFICANT CHANGE UP
BASOPHILS # BLD AUTO: 0.01 K/UL — SIGNIFICANT CHANGE UP (ref 0–0.2)
BASOPHILS NFR BLD AUTO: 0.1 % — SIGNIFICANT CHANGE UP (ref 0–2)
BILIRUB SERPL-MCNC: 0.5 MG/DL — SIGNIFICANT CHANGE UP (ref 0.4–2)
BUN SERPL-MCNC: 5.8 MG/DL — LOW (ref 8–20)
CALCIUM SERPL-MCNC: 8.5 MG/DL — LOW (ref 8.6–10.2)
CHLORIDE SERPL-SCNC: 105 MMOL/L — SIGNIFICANT CHANGE UP (ref 98–107)
CO2 SERPL-SCNC: 21 MMOL/L — LOW (ref 22–29)
CREAT SERPL-MCNC: 0.57 MG/DL — SIGNIFICANT CHANGE UP (ref 0.5–1.3)
EOSINOPHIL # BLD AUTO: 0.12 K/UL — SIGNIFICANT CHANGE UP (ref 0–0.5)
EOSINOPHIL NFR BLD AUTO: 1.5 % — SIGNIFICANT CHANGE UP (ref 0–6)
GLUCOSE SERPL-MCNC: 87 MG/DL — SIGNIFICANT CHANGE UP (ref 70–99)
HCT VFR BLD CALC: 38.9 % — SIGNIFICANT CHANGE UP (ref 34.5–45)
HGB BLD-MCNC: 13.3 G/DL — SIGNIFICANT CHANGE UP (ref 11.5–15.5)
IMM GRANULOCYTES NFR BLD AUTO: 0.1 % — SIGNIFICANT CHANGE UP (ref 0–1.5)
LYMPHOCYTES # BLD AUTO: 1.32 K/UL — SIGNIFICANT CHANGE UP (ref 1–3.3)
LYMPHOCYTES # BLD AUTO: 17 % — SIGNIFICANT CHANGE UP (ref 13–44)
MCHC RBC-ENTMCNC: 32.5 PG — SIGNIFICANT CHANGE UP (ref 27–34)
MCHC RBC-ENTMCNC: 34.2 GM/DL — SIGNIFICANT CHANGE UP (ref 32–36)
MCV RBC AUTO: 95.1 FL — SIGNIFICANT CHANGE UP (ref 80–100)
MONOCYTES # BLD AUTO: 0.4 K/UL — SIGNIFICANT CHANGE UP (ref 0–0.9)
MONOCYTES NFR BLD AUTO: 5.1 % — SIGNIFICANT CHANGE UP (ref 2–14)
NEUTROPHILS # BLD AUTO: 5.92 K/UL — SIGNIFICANT CHANGE UP (ref 1.8–7.4)
NEUTROPHILS NFR BLD AUTO: 76.2 % — SIGNIFICANT CHANGE UP (ref 43–77)
PLATELET # BLD AUTO: 209 K/UL — SIGNIFICANT CHANGE UP (ref 150–400)
POTASSIUM SERPL-MCNC: 3.9 MMOL/L — SIGNIFICANT CHANGE UP (ref 3.5–5.3)
POTASSIUM SERPL-SCNC: 3.9 MMOL/L — SIGNIFICANT CHANGE UP (ref 3.5–5.3)
PROT SERPL-MCNC: 6.9 G/DL — SIGNIFICANT CHANGE UP (ref 6.6–8.7)
RAPID RVP RESULT: SIGNIFICANT CHANGE UP
RBC # BLD: 4.09 M/UL — SIGNIFICANT CHANGE UP (ref 3.8–5.2)
RBC # FLD: 13.1 % — SIGNIFICANT CHANGE UP (ref 10.3–14.5)
SARS-COV-2 RNA SPEC QL NAA+PROBE: SIGNIFICANT CHANGE UP
SODIUM SERPL-SCNC: 137 MMOL/L — SIGNIFICANT CHANGE UP (ref 135–145)
WBC # BLD: 7.78 K/UL — SIGNIFICANT CHANGE UP (ref 3.8–10.5)
WBC # FLD AUTO: 7.78 K/UL — SIGNIFICANT CHANGE UP (ref 3.8–10.5)

## 2021-12-04 PROCEDURE — 0225U NFCT DS DNA&RNA 21 SARSCOV2: CPT

## 2021-12-04 PROCEDURE — 99284 EMERGENCY DEPT VISIT MOD MDM: CPT

## 2021-12-04 PROCEDURE — 99284 EMERGENCY DEPT VISIT MOD MDM: CPT | Mod: 25

## 2021-12-04 PROCEDURE — 36415 COLL VENOUS BLD VENIPUNCTURE: CPT

## 2021-12-04 PROCEDURE — 96374 THER/PROPH/DIAG INJ IV PUSH: CPT

## 2021-12-04 PROCEDURE — 85025 COMPLETE CBC W/AUTO DIFF WBC: CPT

## 2021-12-04 PROCEDURE — 80053 COMPREHEN METABOLIC PANEL: CPT

## 2021-12-04 RX ORDER — METOCLOPRAMIDE HCL 10 MG
1 TABLET ORAL
Qty: 9 | Refills: 0
Start: 2021-12-04 | End: 2021-12-06

## 2021-12-04 RX ORDER — METOCLOPRAMIDE HCL 10 MG
10 TABLET ORAL ONCE
Refills: 0 | Status: COMPLETED | OUTPATIENT
Start: 2021-12-04 | End: 2021-12-04

## 2021-12-04 RX ORDER — SODIUM CHLORIDE 9 MG/ML
1000 INJECTION, SOLUTION INTRAVENOUS ONCE
Refills: 0 | Status: COMPLETED | OUTPATIENT
Start: 2021-12-04 | End: 2021-12-04

## 2021-12-04 RX ADMIN — SODIUM CHLORIDE 1000 MILLILITER(S): 9 INJECTION, SOLUTION INTRAVENOUS at 19:35

## 2021-12-04 RX ADMIN — Medication 10 MILLIGRAM(S): at 19:35

## 2021-12-04 RX ADMIN — SODIUM CHLORIDE 1000 MILLILITER(S): 9 INJECTION, SOLUTION INTRAVENOUS at 19:40

## 2021-12-04 NOTE — ED STATDOCS - PATIENT PORTAL LINK FT
You can access the FollowMyHealth Patient Portal offered by [facility] by registering at the following website: [email or web address]. By joining [employer]’s FollowMyHealth portal, you will also be able to view your health information using other applications (apps) compatible with our system.

## 2021-12-04 NOTE — ED ADULT NURSE NOTE - OBJECTIVE STATEMENT
Pt alert and oriented x4, c/o N/V and cold like symptoms, 11 weeks pregnant with her 4th, RVP/Covid sent

## 2021-12-04 NOTE — ED STATDOCS - CLINICAL SUMMARY MEDICAL DECISION MAKING FREE TEXT BOX
11 weeks pregnant with nausea, vomiting and diarrhea. Will get labs, IV fluids, Reglan, RVP and reevaluate.

## 2021-12-04 NOTE — ED STATDOCS - OBJECTIVE STATEMENT
40 y/o female @11 weeks pregnant with no PMHx. Patient p/w nasal congestion, runny nose, x5 diarrhea, and nausea. 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 Patient is covid vaccinated but has not received flu shot.  Denies sick contact. OBGYN Dr. Morton

## 2021-12-04 NOTE — ED STATDOCS - CPE ED RESP NORM
Two RNs attempted three additional PIV placements without success. No lab specimens collected. Parents refusing any additional attempts at this time. MD updated on current status of patient and parents wishes. Patient's WOB remains improved, he is being held comfortably by the mother at bedside. All attempts went well with the use of sweetease and comforting by mother afterwards. Family provided with beverages. Family educated on plan of care regarding admission. No other needs or questions at this time. normal...

## 2021-12-04 NOTE — ED STATDOCS - NSFOLLOWUPINSTRUCTIONS_ED_ALL_ED_FT
Viral Respiratory Infection    A viral respiratory infection is an illness that affects parts of the body used for breathing, like the lungs, nose, and throat. It is caused by a germ called a virus. Symptoms can include runny nose, coughing, sneezing, fatigue, body aches, sore throat, fever, or headache. Over the counter medicine can be used to manage the symptoms but the infection typically goes away on its own in 5 to 10 days.     SEEK IMMEDIATE MEDICAL CARE IF YOU HAVE ANY OF THE FOLLOWING SYMPTOMS: shortness of breath, chest pain, fever over 10 days, or lightheadedness/dizziness.     Diarrhea    Diarrhea is frequent loose or watery bowel movements that has many causes. Diarrhea can make you feel weak and cause you to become dehydrated. Diarrhea typically lasts 2–3 days, but can last longer if it is a sign of something more serious. Drink clear fluids to prevent dehydration. Eat bland, easy-to-digest foods as tolerated.     SEEK IMMEDIATE MEDICAL CARE IF YOU HAVE ANY OF THE FOLLOWING SYMPTOMS: high fevers, lightheadedness/dizziness, chest pain, black or bloody stools, shortness of breath, severe abdominal or back pain, or any signs of dehydration.     Please follow up with your doctor within 24 hours  Please follow up with your OB/GYN within 48 hours.

## 2021-12-04 NOTE — ED STATDOCS - NS_ ATTENDINGSCRIBEDETAILS _ED_A_ED_FT
I, Alex Rivero, performed the initial face to face bedside interview with this patient regarding history of present illness, review of symptoms and relevant past medical, social and family history.  I completed an independent physical examination.  I was the initial provider who evaluated this patient. I have signed out the follow up of any pending tests (i.e. labs, radiological studies) to the ACP.  I have communicated the patient’s plan of care and disposition with the ACP.  The history, relevant review of systems, past medical and surgical history, medical decision making, and physical examination was documented by the scribe in my presence and I attest to the accuracy of the documentation.

## 2021-12-04 NOTE — ED ADULT TRIAGE NOTE - CHIEF COMPLAINT QUOTE
pt 11 weeks pregnant with cold like sx, abdominal pain and diarrhea. seen by MD prescribed zpack and Claritin but did not start. requesting covid test

## 2021-12-04 NOTE — ED ADULT NURSE NOTE - SUICIDE SCREENING QUESTION 2
PRE-SEDATION ASSESSMENT    CONSENT  Consent for procedure and sedation obtained: Yes    MEDICAL HISTORY       PHYSICAL EXAM  History and Physical Reviewed: H&P completed today  Airway Anatomy : Class III    OTHER FINDINGS  Reviewed current medications and allergies: Yes  Pertinent lab/diagnostic test reviewed: Yes    SEDATION RISK ASSESSMENT  Risk Status ASA: Class III - Severe systemic disease, limits activity, is not incapacitated  Plan for Sedation: Moderate Sedation  EKG Monitoring: Yes    NARRATIVE FINDINGS     
No

## 2021-12-04 NOTE — ED STATDOCS - PROGRESS NOTE DETAILS
PT tolerated PO in ED. feeling better. ED return precautions discussed. will d/c with outpatient follow up

## 2022-06-09 NOTE — ED STATDOCS - CPE ED NEURO NORM
Patient: Whit Mayorga  YOB: 1947  Date: 6/9/22        Subjective:     Whit Mayorga is a 75 y.o. male who complains today of:    Chief Complaint   Patient presents with    Back Pain         Allergies:  Patient has no known allergies. Past Medical History:   Diagnosis Date    Cigarette smoker     CHRONIC    Elevated cholesterol     Elevated glucose     Femoral bruit     left    HTN (hypertension)     Hypercholesteremia     Hyperlipidemia     Kidney stone     Metabolic syndrome     Reflux esophagitis      Past Surgical History:   Procedure Laterality Date    COLONOSCOPY  01/02/07    2 POLYPS REMOVED    LITHOTRIPSY  2012    Kidney stones     History reviewed. No pertinent family history. Social History     Socioeconomic History    Marital status:      Spouse name: Not on file    Number of children: Not on file    Years of education: Not on file    Highest education level: Not on file   Occupational History    Not on file   Tobacco Use    Smoking status: Current Every Day Smoker     Packs/day: 1.00     Years: 40.00     Pack years: 40.00     Types: Cigarettes    Smokeless tobacco: Never Used   Substance and Sexual Activity    Alcohol use: Yes     Comment: approx. 2/wk    Drug use: Not on file    Sexual activity: Not on file   Other Topics Concern    Not on file   Social History Narrative    Not on file     Social Determinants of Health     Financial Resource Strain:     Difficulty of Paying Living Expenses: Not on file   Food Insecurity:     Worried About Running Out of Food in the Last Year: Not on file    Anupama of Food in the Last Year: Not on file   Transportation Needs:     Lack of Transportation (Medical): Not on file    Lack of Transportation (Non-Medical):  Not on file   Physical Activity:     Days of Exercise per Week: Not on file    Minutes of Exercise per Session: Not on file   Stress:     Feeling of Stress : Not on file   Social Connections:  Frequency of Communication with Friends and Family: Not on file    Frequency of Social Gatherings with Friends and Family: Not on file    Attends Anabaptism Services: Not on file    Active Member of Clubs or Organizations: Not on file    Attends Club or Organization Meetings: Not on file    Marital Status: Not on file   Intimate Partner Violence:     Fear of Current or Ex-Partner: Not on file    Emotionally Abused: Not on file    Physically Abused: Not on file    Sexually Abused: Not on file   Housing Stability:     Unable to Pay for Housing in the Last Year: Not on file    Number of [place] in the Last Year: Not on file    Unstable Housing in the Last Year: Not on file       Current Outpatient Medications on File Prior to Visit   Medication Sig Dispense Refill    SYMBICORT 160-4.5 MCG/ACT AERO       finasteride (PROSCAR) 5 MG tablet       tamsulosin (FLOMAX) 0.4 MG capsule       ANORO ELLIPTA 62.5-25 MCG/INH AEPB       ibuprofen (ADVIL;MOTRIN) 800 MG tablet       benazepril (LOTENSIN) 10 MG tablet TAKE ONE TABLET BY MOUTH EVERY DAY 90 tablet 3    ibuprofen (IBU) 800 MG tablet Take 1 tablet by mouth every 6 hours as needed for Pain. 50 tablet 3    ranitidine (ZANTAC) 150 MG tablet Take 1 tablet by mouth nightly. 90 tablet 3     No current facility-administered medications on file prior to visit. Following for chronic low back pain. Pain 1-2/10. History renal cell carcinoma follows with urology at [facility] (Dr. Kenia Wolf) and has to have MRI done of kidneys intermittently. Has been clear. He does HEP, PT exercises, twice weekly. Uses bands. Golfing prn. Has hemophilia, which limits our epidural injection treatment. Constant ache for over 2 years.  + DM. He tries to play golf twice a week and garden and medications allow him to do this. Hx of DDD and stenosis. He has tried injections in the past without lasting relief. He had Covid in January 2021. Was sick for 2 months.   Got both vaccines.     Takes gabapentin 600mg from TID and norco 5mg TID PRN pain. Pt feels that AM, bending, twisting to left makes the pain worse, and medication, exercise, stretching makes the pain better. Pt feels his medication helps him function and improve his quality of life, specifically says allows to exercise/walk, do ADL's. Pt admits to occasional Lt LE radiating numbness and tingling when turning to Lt. Denies recent falls, injuries or trauma. Pt denies new weakness. Pt reports PT has been done in the past.     Back Pain  Pertinent negatives include no headaches, numbness or weakness. Review of Systems   Constitutional: Negative. Negative for activity change and unexpected weight change. HENT: Negative. Negative for hearing loss. Respiratory: Negative. Cardiovascular: Negative for leg swelling. Gastrointestinal: Negative for constipation and diarrhea. Genitourinary: Negative. Musculoskeletal: Positive for back pain. Negative for gait problem, joint swelling and neck pain. Skin: Negative for rash. Neurological: Negative for dizziness, weakness, numbness and headaches. Psychiatric/Behavioral: Negative. Negative for sleep disturbance. Objective:     Vitals:  /72   Temp 98.1 °F (36.7 °C)   Ht 6' (1.829 m)   Wt 175 lb (79.4 kg)   BMI 23.73 kg/m² Pain Score:   2    Physical Exam  Vitals reviewed. Constitutional:       Appearance: He is well-developed. HENT:      Head: Normocephalic. Nose: Nose normal.   Pulmonary:      Effort: Pulmonary effort is normal.   Musculoskeletal:      Cervical back: Normal range of motion and neck supple. Lumbar back: Decreased range of motion. Negative right straight leg raise test and negative left straight leg raise test.   Lymphadenopathy:      Cervical: No cervical adenopathy. Skin:     General: Skin is warm and dry. Findings: No erythema or rash.    Neurological:      Mental Status: He is alert and oriented to person, place, and time. Cranial Nerves: No cranial nerve deficit. Deep Tendon Reflexes: Reflexes are normal and symmetric. Reflexes normal.   Psychiatric:         Mood and Affect: Mood normal.         Behavior: Behavior normal.         Thought Content: Thought content normal.         Judgment: Judgment normal.            Assessment:        Diagnosis Orders   1. Lumbar stenosis with neurogenic claudication  gabapentin (NEURONTIN) 600 MG tablet    HYDROcodone-acetaminophen (NORCO) 5-325 MG per tablet   2. Lumbar spondylosis  HYDROcodone-acetaminophen (NORCO) 5-325 MG per tablet   3. Chronic, continuous use of opioids  HYDROcodone-acetaminophen (NORCO) 5-325 MG per tablet   4. Chronic pain syndrome  HYDROcodone-acetaminophen (NORCO) 5-325 MG per tablet       Plan:     Periodic Controlled Substance Monitoring: Possible medication side effects, risk of tolerance/dependence & alternative treatments discussed. ,No signs of potential drug abuse or diversion identified. ,Assessed functional status. BHARATH Varghese - CNP)    Orders Placed This Encounter   Medications    gabapentin (NEURONTIN) 600 MG tablet     Sig: Take 1 tablet by mouth 3 times daily for 90 days. Dispense:  270 tablet     Refill:  0    HYDROcodone-acetaminophen (NORCO) 5-325 MG per tablet     Sig: Take 1 tablet by mouth 3 times daily as needed for Pain for up to 30 days. Dispense:  90 tablet     Refill:  0     Reduce doses taken as pain becomes manageable       No orders of the defined types were placed in this encounter. Discussed options with the patient today. Anatomic model pathology was shown and reviewed with pt.  All questions were answered.  Relevant imaging reviewed, NDP reviewed and pain generators reviewed. Pt verbalized understanding and agrees with above plan.  Will continue medications as they do help pt function with ADL and improve quality of life.  Pt understands potential side effects from opioids such as constipation, dry mouth, dizziness, opioid use disorder, and overdose. Pt has failed non opioid therapy and decision was made to prescribe opioids to help with daily function and improve quality of life. Discussed the principles of opioid tolerance as well as the concerns regarding opioid diversion, misuse, and abuse.      Discussed the risks of respiratory depression and death while on pain medications, especially when combined with other sedative substances.     Discussed the elevated risks of excessive sedation while on pain medications. Advised him against driving or operating heavy machinery or performing any activities where he may harm himself or others while on pain medications. Particular caution was emphasized especially during dose adjustments and medication changes.      [place]ARRS was reviewed. UTOX from 5/10/2022 appropriate; ORT score = 4  Daily MME 15  Narcan prescribed declined and understands the proper use in the event of an overdose.      This NP saw pt under direct supervision of Dr Sharma. Follow up:  Return in about 4 weeks (around 7/7/2022) for review meds and reassess pain.     Ed Milton, APRN - CNP normal...

## 2022-12-09 NOTE — ED ADULT TRIAGE NOTE - DIRECT TO ROOM CARE INITIATED:
Problem List Items Addressed This Visit          Symptoms and Signs    Chest pressure - Primary     NM stress test neg  On omeprazole and no other GI symptoms recently  Pt had recently resumed planks and upper extremity strength exercises so this could be musculoskeletal pain  No further symptoms  Will observe for now                   Other Visit Diagnoses       Screening for ischemic heart disease        has thoracic aortic atherosclerosis, no definite CAD seen on CT chest.  CAC discussed - pt agreeable.     Relevant Orders    CT HEART CALCIUM SCORING            27-Jun-2018 20:59

## 2023-03-08 NOTE — ED ADULT NURSE NOTE - OBJECTIVE STATEMENT
Pt. presents to ED AxOx3 with complaint of L. leg abcess. Pt. has had multiple small abcesses that started beginning of January. Pt. came in and states she was tested at the sites for MRSA and results were negative. Pt. was put on 10 day doxycycline course in January, pt. states she finished the course. Pt. denies recent travel, animal bites, hx of skin conditions. Pt. denies difficulty ambulating but states she sometimes limps due to the pain in L. leg. Notable redness and tenderness around sites.
63.5

## 2023-07-18 NOTE — ED ADULT NURSE NOTE - MODE OF DISCHARGE
Regard[age] yo female, mother stated the patient is having nose bleeds since   ----- Message from Kia Blank sent at 2023  8:32 AM CDT -----  Patient Name: Jennifer Villegas  Caller: mother  Call Back # : [phone]    Is this for an Active episode for Home Health, Hospice or Palliative Care? No   Specialist or PCP Name: cody valdez  Symptoms3 yo female, mother stated the patient is having nose bleeds since  and the last one was last night and mother is concerned  Pregnant (females aged 13-60. If Yes, how long?) n/a  Name of Clinic Site / Acct# [email or web address]      Are you currently at (or near) your place of residence? Yes [place] [address]     Use following scripting for patients waiting for a callback:   \"Nurse callback times vary based on call volumes; please be aware the return phone call may come from an unidentified or out of state phone number. If your symptoms worsen or become life threatening while waiting, you should seek immediate assistance by calling 911 or going to the ER for evaluation.\"     Ambulatory

## 2023-11-01 ENCOUNTER — APPOINTMENT (OUTPATIENT)
Dept: FAMILY MEDICINE | Facility: CLINIC | Age: 43
End: 2023-11-01
Payer: MEDICAID

## 2023-11-01 VITALS
BODY MASS INDEX: 34.95 KG/M2 | HEIGHT: 72 IN | WEIGHT: 258 LBS | HEART RATE: 58 BPM | OXYGEN SATURATION: 98 % | DIASTOLIC BLOOD PRESSURE: 84 MMHG | TEMPERATURE: 97.4 F | RESPIRATION RATE: 16 BRPM | SYSTOLIC BLOOD PRESSURE: 138 MMHG

## 2023-11-01 DIAGNOSIS — Z00.00 ENCOUNTER FOR GENERAL ADULT MEDICAL EXAMINATION W/OUT ABNORMAL FINDINGS: ICD-10-CM

## 2023-12-14 NOTE — ED PROVIDER NOTE - PRINCIPAL DIAGNOSIS
[address] [address]  Dept: [phone]  Dept Fax: [phone]  Loc: [phone]    Visit Date: 12/14/2023    Bre Conteh is a 60 y.o. female who presents todayfor:  Chief Complaint   Patient presents with    New Patient    Hypertension    Hyperlipidemia     Here for the first time  Used to see Mikki Burrelljohn   Had chest pain   Had a stress test and echo done  No obvious issues there   No cath done  Does have chest heaviness on exertion   Does have dyspnea on exertion   Has had multiple syncope episodes   No causes   No known CAD before  Known HTn and hyperlipidemia  On medical RX  No known DM   Family history of CAD   Multiple members      HPI:  Hypertension  Associated symptoms include shortness of breath. Pertinent negatives include no chest pain, neck pain or palpitations. Hyperlipidemia  Associated symptoms include shortness of breath. Pertinent negatives include no chest pain or myalgias. No past medical history on file. No past surgical history on file. No family history on file.   Social History     Tobacco Use    Smoking status: Not on file    Smokeless tobacco: Not on file   Substance Use Topics    Alcohol use: Not on file      Current Outpatient Medications   Medication Sig Dispense Refill    losartan-hydroCHLOROthiazide (HYZAAR) 100-12.5 MG per tablet Take 1 tablet by mouth daily      fluticasone (FLONASE) 50 MCG/ACT nasal spray USE 2 SPRAY(S) IN EACH NOSTRIL ONCE DAILY      atorvastatin (LIPITOR) 20 MG tablet Take 1 tablet by mouth nightly      potassium chloride (KLOR-CON M) 20 MEQ TBCR extended release tablet Take 1 tablet by mouth daily      propranolol (INDERAL) 40 MG tablet Take 1 tablet by mouth 3 times daily      albuterol sulfate HFA (PROVENTIL;VENTOLIN;PROAIR) 108 (90 Base) MCG/ACT inhaler Inhale 2 puffs into the lungs every 6 hours as needed      meclizine (ANTIVERT) 12.5 MG tablet Take 1
Pelvic pain in female

## 2024-03-27 ENCOUNTER — APPOINTMENT (OUTPATIENT)
Dept: GASTROENTEROLOGY | Facility: CLINIC | Age: 44
End: 2024-03-27
Payer: MEDICAID

## 2024-03-27 VITALS
OXYGEN SATURATION: 98 % | RESPIRATION RATE: 16 BRPM | SYSTOLIC BLOOD PRESSURE: 105 MMHG | BODY MASS INDEX: 30.96 KG/M2 | DIASTOLIC BLOOD PRESSURE: 68 MMHG | HEIGHT: 61 IN | HEART RATE: 65 BPM | WEIGHT: 164 LBS

## 2024-03-27 DIAGNOSIS — Z13.9 ENCOUNTER FOR SCREENING, UNSPECIFIED: ICD-10-CM

## 2024-03-27 DIAGNOSIS — Z78.9 OTHER SPECIFIED HEALTH STATUS: ICD-10-CM

## 2024-03-27 PROCEDURE — 99203 OFFICE O/P NEW LOW 30 MIN: CPT

## 2024-03-27 RX ORDER — POLYETHYLENE GLYCOL-3350 AND ELECTROLYTES WITH FLAVOR PACK 240; 5.84; 2.98; 6.72; 22.72 G/278.26G; G/278.26G; G/278.26G; G/278.26G; G/278.26G
240 POWDER, FOR SOLUTION ORAL
Qty: 1 | Refills: 0 | Status: ACTIVE | COMMUNITY
Start: 2024-03-27 | End: 1900-01-01

## 2024-03-27 NOTE — PHYSICAL EXAM
[Alert] : alert [Normal Voice/Communication] : normal voice/communication [Healthy Appearing] : healthy appearing [Sclera] : the sclera and conjunctiva were normal [No Acute Distress] : no acute distress [Hearing Threshold Finger Rub Not [place]] : hearing was normal [Normal Lips/Gums] : the lips and gums were normal [Oropharynx] : the oropharynx was normal [Normal Appearance] : the appearance of the neck was normal [No Neck Mass] : no neck mass was observed [No Respiratory Distress] : no respiratory distress [No Acc Muscle Use] : no accessory muscle use [Auscultation Breath Sounds / Voice Sounds] : lungs were clear to auscultation bilaterally [Respiration, Rhythm And Depth] : normal respiratory rhythm and effort [Heart Rate And Rhythm] : heart rate was normal and rhythm regular [Normal S1, S2] : normal S1 and S2 [Murmurs] : no murmurs [Bowel Sounds] : normal bowel sounds [No Masses] : no abdominal mass palpated [Abdomen Tenderness] : non-tender [Abdomen Soft] : soft [Oriented To Time, Place, And Person] : oriented to person, place, and time [] : no hepatosplenomegaly

## 2024-03-27 NOTE — HISTORY OF PRESENT ILLNESS
[FreeTextEntry1] : AVA SANTAMARIA is a 44 year old female with no PMH and on no medications, presenting today for colon cancer screening. No prior colonoscopy. Family history is negative for colon cancer and polyps. She feels well and offers no complaints. She denies any abdominal pain, bowel changes, black or bloody stools. Occasional mild constipation that she does not treat. She moves her bowels every 2 days. No upper GI complaints. Appetite is good and weight is stable.

## 2024-03-27 NOTE — ASSESSMENT
[FreeTextEntry1] : 44 year old female of average risk of colorectal neoplasm presenting for colon cancer screening. She will be scheduled for a colonoscopy with Gavilyte prep. I have discussed the indications (including but not limited to ruling out inflammatory bowel disease, colorectal neoplasm, GI bleed, and AVM's), benefits, risks  (including but not limited to reaction to the anesthesia, infection, bleeding, missed lesions, and perforation),  and alternatives to colonoscopy with the patient. The patient understands all options and has agreed to have a colonoscopy and is medically optimized for the planned procedure.

## 2024-03-27 NOTE — ADDENDUM
[FreeTextEntry1] : I, Annalee Lopez NP, acted as scribe for Dr. Gary Dominguez for this patient encounter    ***Please note that the medical history and chart note from 11/1/2023 office visit is incorrect and actually the patients twin brothers information. She has no significant medical, surgical, or social history to report.

## 2024-05-01 NOTE — PLAN
[FreeTextEntry1] : CPE: Done  Labs: Ordered and requisition provided to pt and [facility] SW-will review via teleheatlh when available-, contact information verified. Pt stated he may obtain from [facility], will request [facility] to obtain results for review when available  Referral: Dental placed and referral list provided, pt will attempt to make appt  Grave's Disease: Continue meds as directed, f/u w/specialist as scheduled.   Obesity: Continue wt loss efforts; Hydrate well as tolerated; Abstain from ETOH use, fast food, fried food and foods high in sugar. Advised to increase intake of fresh fruit, vegetables and lean protein. Increase daily walking to 30 minutes as tolerated, continue bike riding.  ETOH/Drug Abuse: Abstain from use, continue [facility] program and maintain sessions as scheduled  H/M: Maintain appts as scheduled; Schedule dental and vision exam; Continue wt loss efforts; Smoking cessation discussed  F/u prn

## 2024-05-01 NOTE — COUNSELING
[FreeTextEntry1] : 5 [FreeTextEntry2] : Currently in [facility] Program [FreeTextEntry3] : Walk 30-60 minutes daily as tolerated

## 2024-06-11 ENCOUNTER — TRANSCRIPTION ENCOUNTER (OUTPATIENT)
Age: 44
End: 2024-06-11

## 2024-06-12 ENCOUNTER — OUTPATIENT (OUTPATIENT)
Dept: OUTPATIENT SERVICES | Facility: HOSPITAL | Age: 44
LOS: 1 days | End: 2024-06-12
Payer: COMMERCIAL

## 2024-06-12 ENCOUNTER — APPOINTMENT (OUTPATIENT)
Dept: GASTROENTEROLOGY | Facility: GI CENTER | Age: 44
End: 2024-06-12
Payer: MEDICAID

## 2024-06-12 DIAGNOSIS — Z12.11 ENCOUNTER FOR SCREENING FOR MALIGNANT NEOPLASM OF COLON: ICD-10-CM

## 2024-06-12 PROCEDURE — G0121: CPT

## 2024-06-12 PROCEDURE — 45378 DIAGNOSTIC COLONOSCOPY: CPT | Mod: 33

## 2024-06-12 NOTE — PHYSICAL EXAM

## 2025-03-23 ENCOUNTER — NON-APPOINTMENT (OUTPATIENT)
Age: 45
End: 2025-03-23

## (undated) DEVICE — KIT DEFENDO 4 OLY 4 PC

## (undated) DEVICE — CSC-COLONOSCOPE 2002772: Type: DURABLE MEDICAL EQUIPMENT